# Patient Record
Sex: MALE | Race: OTHER | HISPANIC OR LATINO | URBAN - METROPOLITAN AREA
[De-identification: names, ages, dates, MRNs, and addresses within clinical notes are randomized per-mention and may not be internally consistent; named-entity substitution may affect disease eponyms.]

---
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
## 2021-02-04 ENCOUNTER — TELEPHONE (OUTPATIENT)
Dept: PEDIATRICS CLINIC | Facility: CLINIC | Age: 21
End: 2021-02-04

## 2021-02-18 NOTE — TELEPHONE ENCOUNTER
02/18/21 11:27 AM     Thank you for your request  Your request has been received, reviewed, and the patient chart updated  The PCP has successfully been removed with a patient attribution note  This message will now be completed      Thank you  Namrata Mendes

## 2023-03-06 ENCOUNTER — OFFICE VISIT (OUTPATIENT)
Dept: FAMILY MEDICINE CLINIC | Facility: CLINIC | Age: 23
End: 2023-03-06

## 2023-03-06 ENCOUNTER — APPOINTMENT (OUTPATIENT)
Dept: LAB | Facility: CLINIC | Age: 23
End: 2023-03-06

## 2023-03-06 VITALS — BODY MASS INDEX: 32.79 KG/M2 | WEIGHT: 234.2 LBS | HEIGHT: 71 IN

## 2023-03-06 DIAGNOSIS — Z13.31 DEPRESSION SCREEN: ICD-10-CM

## 2023-03-06 DIAGNOSIS — Z00.00 HEALTHCARE MAINTENANCE: ICD-10-CM

## 2023-03-06 DIAGNOSIS — Z88.9 MULTIPLE ALLERGIES: Primary | ICD-10-CM

## 2023-03-06 DIAGNOSIS — E66.09 CLASS 1 OBESITY DUE TO EXCESS CALORIES WITHOUT SERIOUS COMORBIDITY WITH BODY MASS INDEX (BMI) OF 32.0 TO 32.9 IN ADULT: ICD-10-CM

## 2023-03-06 DIAGNOSIS — Z88.9 MULTIPLE ALLERGIES: ICD-10-CM

## 2023-03-06 DIAGNOSIS — R03.0 ELEVATED BLOOD-PRESSURE READING WITHOUT DIAGNOSIS OF HYPERTENSION: ICD-10-CM

## 2023-03-06 LAB
ALBUMIN SERPL BCP-MCNC: 4.3 G/DL (ref 3.5–5)
ALP SERPL-CCNC: 60 U/L (ref 46–116)
ALT SERPL W P-5'-P-CCNC: 30 U/L (ref 12–78)
ANION GAP SERPL CALCULATED.3IONS-SCNC: 3 MMOL/L (ref 4–13)
AST SERPL W P-5'-P-CCNC: 16 U/L (ref 5–45)
BASOPHILS # BLD AUTO: 0.05 THOUSANDS/ÂΜL (ref 0–0.1)
BASOPHILS NFR BLD AUTO: 1 % (ref 0–1)
BILIRUB SERPL-MCNC: 0.7 MG/DL (ref 0.2–1)
BUN SERPL-MCNC: 15 MG/DL (ref 5–25)
CALCIUM SERPL-MCNC: 9 MG/DL (ref 8.3–10.1)
CHLORIDE SERPL-SCNC: 107 MMOL/L (ref 96–108)
CHOLEST SERPL-MCNC: 140 MG/DL
CO2 SERPL-SCNC: 28 MMOL/L (ref 21–32)
CREAT SERPL-MCNC: 0.88 MG/DL (ref 0.6–1.3)
EOSINOPHIL # BLD AUTO: 0.27 THOUSAND/ÂΜL (ref 0–0.61)
EOSINOPHIL NFR BLD AUTO: 5 % (ref 0–6)
ERYTHROCYTE [DISTWIDTH] IN BLOOD BY AUTOMATED COUNT: 13.7 % (ref 11.6–15.1)
GFR SERPL CREATININE-BSD FRML MDRD: 122 ML/MIN/1.73SQ M
GLUCOSE P FAST SERPL-MCNC: 94 MG/DL (ref 65–99)
HCT VFR BLD AUTO: 45.4 % (ref 36.5–49.3)
HDLC SERPL-MCNC: 33 MG/DL
HGB BLD-MCNC: 14.6 G/DL (ref 12–17)
IMM GRANULOCYTES # BLD AUTO: 0.02 THOUSAND/UL (ref 0–0.2)
IMM GRANULOCYTES NFR BLD AUTO: 0 % (ref 0–2)
LDLC SERPL CALC-MCNC: 86 MG/DL (ref 0–100)
LYMPHOCYTES # BLD AUTO: 1.93 THOUSANDS/ÂΜL (ref 0.6–4.47)
LYMPHOCYTES NFR BLD AUTO: 36 % (ref 14–44)
MCH RBC QN AUTO: 28.3 PG (ref 26.8–34.3)
MCHC RBC AUTO-ENTMCNC: 32.2 G/DL (ref 31.4–37.4)
MCV RBC AUTO: 88 FL (ref 82–98)
MONOCYTES # BLD AUTO: 0.57 THOUSAND/ÂΜL (ref 0.17–1.22)
MONOCYTES NFR BLD AUTO: 11 % (ref 4–12)
NEUTROPHILS # BLD AUTO: 2.49 THOUSANDS/ÂΜL (ref 1.85–7.62)
NEUTS SEG NFR BLD AUTO: 47 % (ref 43–75)
NONHDLC SERPL-MCNC: 107 MG/DL
NRBC BLD AUTO-RTO: 0 /100 WBCS
PLATELET # BLD AUTO: 315 THOUSANDS/UL (ref 149–390)
PMV BLD AUTO: 11.4 FL (ref 8.9–12.7)
POTASSIUM SERPL-SCNC: 4.1 MMOL/L (ref 3.5–5.3)
PROT SERPL-MCNC: 7.7 G/DL (ref 6.4–8.4)
RBC # BLD AUTO: 5.16 MILLION/UL (ref 3.88–5.62)
SODIUM SERPL-SCNC: 138 MMOL/L (ref 135–147)
T4 FREE SERPL-MCNC: 1.08 NG/DL (ref 0.76–1.46)
TRIGL SERPL-MCNC: 103 MG/DL
TSH SERPL DL<=0.05 MIU/L-ACNC: 0.39 UIU/ML (ref 0.45–4.5)
WBC # BLD AUTO: 5.33 THOUSAND/UL (ref 4.31–10.16)

## 2023-03-06 NOTE — PROGRESS NOTES
Assessment/Plan:     Multiple allergies  -Patient Is requesting allergy testing  Orders for today  Elevated blood-pressure reading without diagnosis of hypertension  - Blood pressure is borderline today  Per patient, he does have history of high blood pressure when he was younger, but denies ever taking medication   - Reviewed BP target goal with patient  - Continue to maintain healthy balanced diet with focus on low salt intake  Limit alcohol intake  - Advised to exercise at least 30 minutes a day for at least 5 days out of the week  Return in about 3 months (around 6/6/2023) for Annual physical       Diagnoses and all orders for this visit:    Multiple allergies  -     [facility] Allergy Panel, Adult; Future  -     Food Allergy Profile; Future    Healthcare maintenance  -     CBC and differential; Future  -     Comprehensive metabolic panel; Future  -     Lipid panel; Future  -     TSH, 3rd generation with Free T4 reflex; Future  -     Hemoglobin A1C; Future    Class 1 obesity due to excess calories without serious comorbidity with body mass index (BMI) of 32 0 to 32 9 in adult    Depression screen    Elevated blood-pressure reading without diagnosis of hypertension      All of patients questions were answered  Patient understands and agrees with the above plan  Krystina Dior PA-C  03/06/23  [facility] [place]       Subjective:     Elizabeth Plasencia is a 25 y o  male who  has a past medical history of History of high blood pressure  who presented to the office today to establish care  - Patient is a 25 y o  male who presents today to establish care  Patient notes he had elevated blood pressure when he was younger, but was prescribed indication  However, patient notes he did have to check his blood pressure multiple times throughout the week  Patient notes his diet is high in salt  Patient notes he does workout about 5 times a week and does try to drink water regularly    Patient denies any chest pain, headache, dizziness, shortness of breath, palpitations  - Patient notes he would like to have allergy testing completed  Patient notes he does believe he is allergic to cats  Patient notes also when he uses certain body washes or deodorants, he will have a reaction  Patient notes occasionally he has a lump of his left neck area  Patient notes it comes and goes randomly  Patient denies any associated pain  Dental Regular Visits: Yes    Vision Problems: No       Life Style  Tobacco Use: No  Alcohol Use: Socially  Drug Use: Marijuana once a month      No current outpatient medications on file prior to visit  No current facility-administered medications on file prior to visit  Past Medical History:   Diagnosis Date   • History of high blood pressure     per pt when he was younger     History reviewed  No pertinent surgical history  Social History     Socioeconomic History   • Marital status: Single     Spouse name: None   • Number of children: None   • Years of education: None   • Highest education level: None   Occupational History   • None   Tobacco Use   • Smoking status: Never   • Smokeless tobacco: Never   Substance and Sexual Activity   • Alcohol use: Never   • Drug use: Never   • Sexual activity: Yes     Partners: Female     Birth control/protection: Condom Male   Other Topics Concern   • None   Social History Narrative   • None     Social Determinants of Health     Financial Resource Strain: Not on file   Food Insecurity: Not on file   Transportation Needs: No Transportation Needs   • Lack of Transportation (Medical): No   • Lack of Transportation (Non-Medical):  No   Physical Activity: Not on file   Stress: Not on file   Social Connections: Not on file   Intimate Partner Violence: Not on file   Housing Stability: Not on file     Family History   Problem Relation Age of Onset   • No Known Problems Mother    • No Known Problems Father    • Hypertension Maternal Grandmother    • Asthma Family          Review of Systems   Constitutional: Negative for chills and fever  HENT: Negative for congestion, ear pain and sore throat  Eyes: Negative for pain and visual disturbance  Respiratory: Negative for cough, chest tightness and shortness of breath  Cardiovascular: Negative for chest pain, palpitations and leg swelling  Gastrointestinal: Negative for abdominal pain, constipation, diarrhea, nausea and vomiting  Genitourinary: Negative for difficulty urinating and dysuria  Musculoskeletal: Negative for arthralgias and back pain  Skin: Negative for rash  Neurological: Negative for dizziness and headaches  Psychiatric/Behavioral: The patient is not nervous/anxious  BMI Counseling: Body mass index is 32 66 kg/m²  The BMI is above normal  Nutrition recommendations include decreasing portion sizes, encouraging healthy choices of fruits and vegetables, consuming healthier snacks, limiting drinks that contain sugar, moderation in carbohydrate intake, increasing intake of lean protein and reducing intake of cholesterol  Exercise recommendations include moderate physical activity 150 minutes/week  No pharmacotherapy was ordered  Rationale for BMI follow-up plan is due to patient being overweight or obese  Depression Screening and Follow-up Plan: Patient was screened for depression during today's encounter  They screened negative with a PHQ-2 score of 0  Objective:  BP (P) 138/88 (BP Location: Left arm, Patient Position: Sitting, Cuff Size: Adult)   Pulse (P) 76   Temp (!) (P) 97 1 °F (36 2 °C) (Temporal)   Resp (P) 16   Ht 5' 11" (1 803 m)   Wt 106 kg (234 lb 3 2 oz)   SpO2 (P) 98%   BMI 32 66 kg/m²     Physical Exam  Vitals and nursing note reviewed  Constitutional:       General: He is not in acute distress  Appearance: He is well-developed  HENT:      Head: Normocephalic and atraumatic        Right Ear: External ear normal       Left Ear: External ear normal       Nose: Nose normal    Eyes:      Conjunctiva/sclera: Conjunctivae normal    Cardiovascular:      Rate and Rhythm: Normal rate and regular rhythm  Pulses: Normal pulses  Heart sounds: Normal heart sounds  Pulmonary:      Effort: Pulmonary effort is normal  No respiratory distress  Breath sounds: Normal breath sounds  No wheezing  Musculoskeletal:      Cervical back: Normal range of motion and neck supple  Lymphadenopathy:      Head:      Right side of head: No submandibular adenopathy  Left side of head: No submandibular adenopathy  Cervical: No cervical adenopathy  Skin:     General: Skin is warm and dry  Neurological:      Mental Status: He is alert and oriented to person, place, and time     Psychiatric:         Behavior: Behavior normal          PHQ-2/9 Depression Screening    Little interest or pleasure in doing things: 0 - not at all  Feeling down, depressed, or hopeless: 0 - not at all  PHQ-2 Score: 0  PHQ-2 Interpretation: Negative depression screen

## 2023-03-07 PROBLEM — Z88.9 MULTIPLE ALLERGIES: Status: ACTIVE | Noted: 2023-03-07

## 2023-03-07 PROBLEM — R03.0 ELEVATED BLOOD-PRESSURE READING WITHOUT DIAGNOSIS OF HYPERTENSION: Status: ACTIVE | Noted: 2023-03-07

## 2023-03-07 LAB
A ALTERNATA IGE QN: <0.1 KUA/I
A FUMIGATUS IGE QN: <0.1 KUA/I
ALMOND IGE QN: <0.1 KUA/I
BERMUDA GRASS IGE QN: <0.1 KUA/I
BOXELDER IGE QN: <0.1 KUA/I
C HERBARUM IGE QN: <0.1 KUA/I
CASHEW NUT IGE QN: <0.1 KUA/I
CAT DANDER IGE QN: 23 KUA/I
CMN PIGWEED IGE QN: <0.1 KUA/I
CODFISH IGE QN: <0.1 KUA/I
COMMON RAGWEED IGE QN: <0.1 KUA/I
COTTONWOOD IGE QN: <0.1 KUA/I
D FARINAE IGE QN: 2.66 KUA/I
D PTERONYSS IGE QN: 1.65 KUA/I
DOG DANDER IGE QN: 1.74 KUA/I
EGG WHITE IGE QN: <0.1 KUA/I
EST. AVERAGE GLUCOSE BLD GHB EST-MCNC: 97 MG/DL
GLUTEN IGE QN: <0.1 KUA/I
HAZELNUT IGE QN: <0.1 KUA/L
HBA1C MFR BLD: 5 %
LONDON PLANE IGE QN: <0.1 KUA/I
MILK IGE QN: <0.1 KUA/I
MOUSE URINE PROT IGE QN: <0.1 KUA/I
MT JUNIPER IGE QN: <0.1 KUA/I
MUGWORT IGE QN: <0.1 KUA/I
P NOTATUM IGE QN: <0.1 KUA/I
PEANUT IGE QN: <0.1 KUA/I
ROACH IGE QN: <0.1 KUA/I
SALMON IGE QN: <0.1 KUA/I
SCALLOP IGE QN: <0.1 KUA/L
SESAME SEED IGE QN: <0.1 KUA/I
SHEEP SORREL IGE QN: <0.1 KUA/I
SHRIMP IGE QN: <0.1 KUA/L
SILVER BIRCH IGE QN: <0.1 KUA/I
SOYBEAN IGE QN: <0.1 KUA/I
TIMOTHY IGE QN: <0.1 KUA/I
TOTAL IGE SMQN RAST: 68.3 KU/L (ref 0–113)
TOTAL IGE SMQN RAST: 71.8 KU/L (ref 0–113)
TUNA IGE QN: <0.1 KUA/I
WALNUT IGE QN: <0.1 KUA/I
WALNUT IGE QN: <0.1 KUA/I
WHEAT IGE QN: <0.1 KUA/I
WHITE ASH IGE QN: <0.1 KUA/I
WHITE ELM IGE QN: <0.1 KUA/I
WHITE MULBERRY IGE QN: <0.1 KUA/I
WHITE OAK IGE QN: <0.1 KUA/I

## 2023-03-08 NOTE — ASSESSMENT & PLAN NOTE
- Blood pressure is borderline today  Per patient, he does have history of high blood pressure when he was younger, but denies ever taking medication   - Reviewed BP target goal with patient  - Continue to maintain healthy balanced diet with focus on low salt intake  Limit alcohol intake  - Advised to exercise at least 30 minutes a day for at least 5 days out of the week

## 2023-04-04 ENCOUNTER — TELEPHONE (OUTPATIENT)
Dept: FAMILY MEDICINE CLINIC | Facility: CLINIC | Age: 23
End: 2023-04-04

## 2023-04-04 NOTE — TELEPHONE ENCOUNTER
Yeah, my name is Derick Garcia about a month ago and I've got some work, some blood work done and I haven't gotten my results yet so I was just calling to see if I can get the results  My phone number is [phone] and my name is [address] [place] [address], last name WISAM

## 2024-03-15 ENCOUNTER — OFFICE VISIT (OUTPATIENT)
Dept: FAMILY MEDICINE CLINIC | Facility: CLINIC | Age: 24
End: 2024-03-15

## 2024-03-15 VITALS
TEMPERATURE: 97.4 F | BODY MASS INDEX: 31.92 KG/M2 | SYSTOLIC BLOOD PRESSURE: 120 MMHG | OXYGEN SATURATION: 99 % | WEIGHT: 228 LBS | RESPIRATION RATE: 18 BRPM | DIASTOLIC BLOOD PRESSURE: 70 MMHG | HEART RATE: 66 BPM | HEIGHT: 71 IN

## 2024-03-15 DIAGNOSIS — Z11.59 ENCOUNTER FOR HEPATITIS C SCREENING TEST FOR LOW RISK PATIENT: ICD-10-CM

## 2024-03-15 DIAGNOSIS — Z00.00 HEALTH CARE MAINTENANCE: ICD-10-CM

## 2024-03-15 DIAGNOSIS — Z00.00 ANNUAL PHYSICAL EXAM: Primary | ICD-10-CM

## 2024-03-15 DIAGNOSIS — Z11.4 ENCOUNTER FOR SCREENING FOR HIV: ICD-10-CM

## 2024-03-15 DIAGNOSIS — Z11.3 ROUTINE SCREENING FOR STI (SEXUALLY TRANSMITTED INFECTION): ICD-10-CM

## 2024-03-15 PROCEDURE — 99395 PREV VISIT EST AGE 18-39: CPT | Performed by: PHYSICIAN ASSISTANT

## 2024-03-15 NOTE — PROGRESS NOTES
ADULT ANNUAL PHYSICAL  [facility]    NAME: Mily Esparza  AGE: 23 y.o. SEX: male  : 2000     DATE: 3/15/2024     Assessment and Plan:     Problem List Items Addressed This Visit    None  Visit Diagnoses     Annual physical exam    -  Primary    Encounter for screening for HIV        Relevant Orders    HIV 1/2 AG/AB w Reflex [facility] for 2 yr old and above    Encounter for hepatitis C screening test for low risk patient        Relevant Orders    Hepatitis C antibody    Routine screening for STI (sexually transmitted infection)        Relevant Orders    Chlamydia/GC amplified DNA by PCR    RPR-Syphilis Screening (Total Syphilis IGG/IGM)    Health care maintenance        Relevant Orders    CBC and differential    Comprehensive metabolic panel    Lipid panel    TSH, 3rd generation with Free T4 reflex    Hemoglobin A1C        Annual physical   - Doing well. No concerns.  - Return to clinic in 1 year for physical or sooner, if needed.     I have recommended that this patient have a flu shot and tetanus booster but he declines at this time. I have discussed the risks and benefits of this examination with him. The patient verbalizes understanding.      Immunizations and preventive care screenings were discussed with patient today. Appropriate education was printed on patient's after visit summary.    Counseling:  Alcohol/drug use: discussed moderation in alcohol intake, the recommendations for healthy alcohol use, and avoidance of illicit drug use.  Dental Health: discussed importance of regular tooth brushing, flossing, and dental visits.  Injury prevention: discussed safety/seat belts, safety helmets, smoke detectors, carbon dioxide detectors, and smoking near bedding or upholstery.  Sexual health: discussed sexually transmitted diseases, partner selection, use of condoms, avoidance of unintended pregnancy, and contraceptive  alternatives.  Exercise: the importance of regular exercise/physical activity was discussed. Recommend exercise 3-5 times per week for at least 30 minutes.     BMI Counseling: Body mass index is 31.8 kg/m². The BMI is above normal. Nutrition recommendations include decreasing portion sizes, encouraging healthy choices of fruits and vegetables, consuming healthier snacks, limiting drinks that contain sugar, moderation in carbohydrate intake, increasing intake of lean protein and reducing intake of cholesterol. Exercise recommendations include moderate physical activity 150 minutes/week. No pharmacotherapy was ordered. Rationale for BMI follow-up plan is due to patient being overweight or obese.     Depression Screening and Follow-up Plan: Patient was screened for depression during today's encounter. They screened negative with a PHQ-2 score of 0.        Return in about 1 year (around 3/15/2025) for Annual physical.     Chief Complaint:     Chief Complaint   Patient presents with   • Annual Exam      History of Present Illness:     Adult Annual Physical   Patient here for a comprehensive physical exam. The patient reports no problems. Overall, patient notes he is feeling well. He is requesting STD testing.       Diet and Physical Activity  Diet/Nutrition: well balanced diet.   Exercise: moderate cardiovascular exercise, strength training exercises, and 5-7 times a week on average.      Depression Screening  PHQ-2/9 Depression Screening    Little interest or pleasure in doing things: 0 - not at all  Feeling down, depressed, or hopeless: 0 - not at all  PHQ-2 Score: 0  PHQ-2 Interpretation: Negative depression screen       General Health  Sleep: sleeps well and gets 7-8 hours of sleep on average.   Hearing: normal - bilateral.  Vision: no vision problems.   Dental: no dental visits for >1 year.        Review of Systems:     Review of Systems   Constitutional:  Negative for chills and fever.   HENT:  Negative for  congestion and sore throat.    Eyes:  Negative for pain and visual disturbance.   Respiratory:  Negative for cough, chest tightness and shortness of breath.    Cardiovascular:  Negative for chest pain, palpitations and leg swelling.   Gastrointestinal:  Negative for abdominal pain, constipation, diarrhea, nausea and vomiting.   Genitourinary:  Negative for difficulty urinating and dysuria.   Musculoskeletal:  Negative for arthralgias.   Neurological:  Negative for dizziness and headaches.      Past Medical History:     Past Medical History:   Diagnosis Date   • History of high blood pressure     per pt when he was younger      Past Surgical History:     History reviewed. No pertinent surgical history.   Social History:     Social History     Socioeconomic History   • Marital status: Single     Spouse name: None   • Number of children: None   • Years of education: None   • Highest education level: None   Occupational History   • None   Tobacco Use   • Smoking status: Never   • Smokeless tobacco: Never   Vaping Use   • Vaping status: Never Used   Substance and Sexual Activity   • Alcohol use: Never   • Drug use: Never   • Sexual activity: Yes     Partners: Female     Birth control/protection: Condom Male   Other Topics Concern   • None   Social History Narrative   • None     Social Determinants of Health     Financial Resource Strain: Low Risk  (3/15/2024)    Overall Financial Resource Strain (CARDIA)    • Difficulty of Paying Living Expenses: Not hard at all   Food Insecurity: No Food Insecurity (3/15/2024)    Hunger Vital Sign    • Worried About Running Out of Food in the Last Year: Never true    • Ran Out of Food in the Last Year: Never true   Transportation Needs: No Transportation Needs (3/15/2024)    PRAPARE - Transportation    • Lack of Transportation (Medical): No    • Lack of Transportation (Non-Medical): No   Physical Activity: Not on file   Stress: No Stress Concern Present (5/27/2020)    Received from  [facility]    [place] of Occupational Health - Occupational Stress Questionnaire    • Feeling of Stress : Not at all   Social Connections: Moderately Isolated (5/27/2020)    Received from [facility]    Social Connection and Isolation Panel [NHANES]    • Frequency of Communication with Friends and Family: More than three times a week    • Frequency of Social Gatherings with Friends and Family: More than three times a week    • Attends Episcopal Services: Never    • Active Member of Clubs or Organizations: Yes    • Attends Club or Organization Meetings: 1 to 4 times per year    • Marital Status: Never    Intimate Partner Violence: Not At Risk (5/27/2020)    Received from [facility]    Humiliation, Afraid, Rape, and Kick questionnaire    • Fear of Current or Ex-Partner: No    • Emotionally Abused: No    • Physically Abused: No    • Sexually Abused: No   Housing Stability: Unknown (3/15/2024)    Housing Stability Vital Sign    • Unable to Pay for Housing in the Last Year: No    • Number of Places Lived in the Last Year: Not on file    • Unstable Housing in the Last Year: No       Social Determinants of Health     Tobacco Use: Low Risk  (3/15/2024)    Patient History    • Smoking Tobacco Use: Never    • Smokeless Tobacco Use: Never    • Passive Exposure: Not on file   Alcohol Use: Unknown (5/20/2020)    Received from [facility]    AUDIT-C    • Frequency of Alcohol Consumption: Not on file    • Average Number of Drinks: Not on file    • Frequency of Binge Drinking: Monthly   Financial Resource Strain: Low Risk  (3/15/2024)    Overall Financial Resource Strain (CARDIA)    • Difficulty of Paying Living Expenses: Not hard at all   Food Insecurity: No Food Insecurity (3/15/2024)    Hunger Vital Sign    • Worried About Running Out of Food in the Last Year: Never true    • Ran Out of Food in the Last Year: Never true   Transportation Needs:  No Transportation Needs (3/15/2024)    PRAPARE - Transportation    • Lack of Transportation (Medical): No    • Lack of Transportation (Non-Medical): No   Physical Activity: Not on file   Stress: No Stress Concern Present (5/27/2020)    Received from [facility]    [place] of Occupational Health - Occupational Stress Questionnaire    • Feeling of Stress : Not at all   Social Connections: Moderately Isolated (5/27/2020)    Received from [facility]    Social Connection and Isolation Panel [NHANES]    • Frequency of Communication with Friends and Family: More than three times a week    • Frequency of Social Gatherings with Friends and Family: More than three times a week    • Attends Jewish Services: Never    • Active Member of Clubs or Organizations: Yes    • Attends Club or Organization Meetings: 1 to 4 times per year    • Marital Status: Never    Intimate Partner Violence: Not At Risk (5/27/2020)    Received from [facility]    Humiliation, Afraid, Rape, and Kick questionnaire    • Fear of Current or Ex-Partner: No    • Emotionally Abused: No    • Physically Abused: No    • Sexually Abused: No   Depression: Not at risk (3/15/2024)    PHQ-2    • PHQ-2 Score: 0   Housing Stability: Unknown (3/15/2024)    Housing Stability Vital Sign    • Unable to Pay for Housing in the Last Year: No    • Number of Places Lived in the Last Year: Not on file    • Unstable Housing in the Last Year: No   Utilities: Not At Risk (3/15/2024)    [facility] Utilities    • Threatened with loss of utilities: No   Health Literacy: Not on file        Family History:     Family History   Problem Relation Age of Onset   • No Known Problems Mother    • No Known Problems Father    • Hypertension Maternal Grandmother    • Asthma Family       Current Medications:     No current outpatient medications on file.     No current facility-administered medications for this visit.      Allergies:  "    No Known Allergies   Physical Exam:     /70 (BP Location: Right arm, Patient Position: Sitting, Cuff Size: Standard)   Pulse 66   Temp (!) 97.4 °F (36.3 °C) (Temporal)   Resp 18   Ht 5' 11\" (1.803 m)   Wt 103 kg (228 lb)   SpO2 99%   BMI 31.80 kg/m²     Physical Exam  Vitals and nursing note reviewed.   Constitutional:       General: He is not in acute distress.     Appearance: He is well-developed.   HENT:      Head: Normocephalic and atraumatic.      Right Ear: External ear normal.      Left Ear: External ear normal.      Nose: Nose normal.   Eyes:      Conjunctiva/sclera: Conjunctivae normal.   Cardiovascular:      Rate and Rhythm: Normal rate and regular rhythm.      Pulses: Normal pulses.      Heart sounds: Normal heart sounds.   Pulmonary:      Effort: Pulmonary effort is normal. No respiratory distress.      Breath sounds: Normal breath sounds. No wheezing.   Abdominal:      General: Bowel sounds are normal.      Palpations: Abdomen is soft.      Tenderness: There is no abdominal tenderness.   Musculoskeletal:         General: Normal range of motion.      Cervical back: Normal range of motion and neck supple.   Skin:     General: Skin is warm and dry.      Findings: No rash.   Neurological:      Mental Status: He is alert and oriented to person, place, and time.   Psychiatric:         Behavior: Behavior normal.         Susana Steinberg PA-C   [facility]  "

## 2024-03-26 ENCOUNTER — APPOINTMENT (OUTPATIENT)
Dept: LAB | Facility: CLINIC | Age: 24
End: 2024-03-26
Payer: COMMERCIAL

## 2024-03-28 ENCOUNTER — APPOINTMENT (OUTPATIENT)
Dept: LAB | Facility: CLINIC | Age: 24
End: 2024-03-28
Payer: COMMERCIAL

## 2024-03-28 LAB — TREPONEMA PALLIDUM IGG+IGM AB [PRESENCE] IN SERUM OR PLASMA BY IMMUNOASSAY: NORMAL

## 2025-06-18 ENCOUNTER — APPOINTMENT (OUTPATIENT)
Dept: PHYSICAL THERAPY | Facility: MEDICAL CENTER | Age: 25
End: 2025-06-18

## 2025-06-18 ENCOUNTER — APPOINTMENT (OUTPATIENT)
Dept: URGENT CARE | Facility: MEDICAL CENTER | Age: 25
End: 2025-06-18

## 2025-06-18 PROCEDURE — 97530 THERAPEUTIC ACTIVITIES: CPT
